# Patient Record
Sex: MALE | Race: WHITE | Employment: FULL TIME | URBAN - NONMETROPOLITAN AREA
[De-identification: names, ages, dates, MRNs, and addresses within clinical notes are randomized per-mention and may not be internally consistent; named-entity substitution may affect disease eponyms.]

---

## 2021-02-25 ENCOUNTER — OFFICE VISIT (OUTPATIENT)
Dept: FAMILY MEDICINE CLINIC | Age: 26
End: 2021-02-25
Payer: COMMERCIAL

## 2021-02-25 VITALS
BODY MASS INDEX: 25.24 KG/M2 | OXYGEN SATURATION: 98 % | DIASTOLIC BLOOD PRESSURE: 62 MMHG | WEIGHT: 181 LBS | SYSTOLIC BLOOD PRESSURE: 118 MMHG | HEART RATE: 60 BPM | TEMPERATURE: 97.8 F

## 2021-02-25 DIAGNOSIS — Z00.00 ENCOUNTER FOR PREVENTATIVE ADULT HEALTH CARE EXAMINATION: Primary | ICD-10-CM

## 2021-02-25 PROCEDURE — 99395 PREV VISIT EST AGE 18-39: CPT | Performed by: FAMILY MEDICINE

## 2021-02-25 NOTE — PROGRESS NOTES
Subjective: Vinod Saavedra is a 27 y.o. 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 He has had no nausea vomiting or diarrhea. No rash no syncope no loss of consciousness he is physically very active he is just past an EMT course. No significant tobacco use. No alcohol use as well. He sleeps well at night he eats well. He denies any anxiety or depression. Home Medications    Not on File      No Known Allergies  Social History     Tobacco Use    Smoking status: Never Smoker   Substance Use Topics    Alcohol use: Yes     Comment: OCCASIONALLY    Drug use: Not on file            Review of Systems   Constitutional: Negative. HENT: Negative. Eyes: Negative. Respiratory: Negative. Cardiovascular: Negative. Gastrointestinal: Negative. Endocrine: Negative. Genitourinary: Negative. Musculoskeletal: Negative. Allergic/Immunologic: Negative. Neurological: Negative. Psychiatric/Behavioral: Negative. Physical Exam   Objective:     Visit Vitals  /62   Pulse 60   Temp 97.8 °F (36.6 °C)   Wt 181 lb (82.1 kg)   SpO2 98%   BMI 25.24 kg/m²      General: alert, cooperative, no distress   Mental  status: normal mood, behavior, speech, dress, motor activity, and thought processes, able to follow commands   HENT: NCAT   Neck: no visualized mass   Resp: no respiratory distress   Neuro: no gross deficits   Skin: no discoloration or lesions of concern on visible areas   Psychiatric: normal affect, consistent with stated mood, no evidence of hallucinations   Exam is completely normal normal male genitalia. No hernias. No nodules self testicular exam was taught.   No scoliosis extremities are clear he is not clinically anxious or depressed    Assessment & Plan:     Adult health evaluation: Sickle exam is completely normal.  His PPD was placed. That will be followed up in 72 hours. I allowed the patient to ventilate regarding issues in his life he is in a very good place he is working quite hard he is active and engaged in what going on. Work is necessary at this time. We will follow up on an as-needed basis. 712    Additional exam findings: We discussed the expected course, resolution and complications of the diagnosis(es) in detail. Medication risks, benefits, costs, interactions, and alternatives were discussed as indicated. I advised him to contact the office if his condition worsens, changes or fails to improve as anticipated. He expressed understanding with the diagnosis(es) and plan.

## 2021-03-27 PROBLEM — Z00.00 ENCOUNTER FOR PREVENTATIVE ADULT HEALTH CARE EXAMINATION: Status: RESOLVED | Noted: 2021-02-25 | Resolved: 2021-03-27

## 2021-07-30 ENCOUNTER — HOSPITAL ENCOUNTER (OUTPATIENT)
Dept: LAB | Age: 26
Discharge: HOME | End: 2021-07-30
Payer: COMMERCIAL

## 2021-07-30 ENCOUNTER — OFFICE VISIT (OUTPATIENT)
Dept: FAMILY MEDICINE CLINIC | Age: 26
End: 2021-07-30
Payer: COMMERCIAL

## 2021-07-30 DIAGNOSIS — Z11.3 SCREENING FOR VENEREAL DISEASE (VD): Primary | ICD-10-CM

## 2021-07-30 PROCEDURE — 36415 COLL VENOUS BLD VENIPUNCTURE: CPT

## 2021-07-30 PROCEDURE — 3511F CHLMYD/GONRH TSTS DOCD DONE: CPT | Performed by: FAMILY MEDICINE

## 2021-07-30 PROCEDURE — 99213 OFFICE O/P EST LOW 20 MIN: CPT | Performed by: FAMILY MEDICINE

## 2021-07-30 PROCEDURE — 87389 HIV-1 AG W/HIV-1&-2 AB AG IA: CPT

## 2021-07-30 PROCEDURE — 87491 CHLMYD TRACH DNA AMP PROBE: CPT

## 2021-07-30 NOTE — PROGRESS NOTES
Subjective: Afua Ramos is a 27 y.o. male who was seen for No chief complaint on file. HPI is a 25-year-old male who had sexual intercourse several weeks ago with a woman in [facility]. The condom apparently broke. He has been concerned since that time. He has had a little itchy sensation on his penis there was oral sex involved. He has had no fever or chills no true discharge. Bowel movements have been appropriate. No rashes. Eating relatively well he is changing jobs and moving to [place]. Home Medications    Not on File      No Known Allergies  Social History     Tobacco Use    Smoking status: Never Smoker   Substance Use Topics    Alcohol use: Yes     Comment: OCCASIONALLY    Drug use: Not on file            Review of Systems   Constitutional: Negative. HENT: Negative. Eyes: Negative. Respiratory: Negative. Cardiovascular: Negative. Gastrointestinal: Negative. Endocrine: Negative. Genitourinary: Negative. Musculoskeletal: Negative. Neurological: Negative. Hematological: Negative. Psychiatric/Behavioral: Negative. Physical Exam   Objective: There were no vitals taken for this visit. General: alert, cooperative, no distress   Mental  status: normal mood, behavior, speech, dress, motor activity, and thought processes, able to follow commands   HENT: NCAT   Neck: no visualized mass   Resp: no respiratory distress   Neuro: no gross deficits   Skin: no discoloration or lesions of concern on visible areas   Psychiatric: normal affect, consistent with stated mood, no evidence of hallucinations   Are clear the cardiovascular exam shows a regular rate and rhythm the abdomen is benign the extremities are clear pleasant and cooperative in no distress.     Assessment & Plan:     Screening for venereal disease: I am going to check a urine for gonorrhea and chlamydia I am going to check an HIV test.  There is no real significant concern about an RPR that is needed at this point I do not think he needs antibiotics I had a long discussion with patient about safe sex which we have talked about before I suggested he use a better brand of condom and may be not put it on very tightly. He will call me if there are any questions. 712    Additional exam findings: We discussed the expected course, resolution and complications of the diagnosis(es) in detail. Medication risks, benefits, costs, interactions, and alternatives were discussed as indicated. I advised him to contact the office if his condition worsens, changes or fails to improve as anticipated. He expressed understanding with the diagnosis(es) and plan.

## 2021-08-03 LAB
HIV 1+2 AB+HIV1 P24 AG SERPL QL IA: NONREACTIVE
HIV12 RESULT COMMENT, HHIVC: NORMAL

## 2021-08-04 ENCOUNTER — TELEPHONE (OUTPATIENT)
Dept: BEHAVIORAL HEALTH UNIT | Age: 26
End: 2021-08-04

## 2021-08-04 DIAGNOSIS — N34.2 URETHRITIS: Primary | ICD-10-CM

## 2021-08-04 LAB
C TRACH RRNA SPEC QL NAA+PROBE: POSITIVE
N GONORRHOEA RRNA SPEC QL NAA+PROBE: NEGATIVE
PLEASE NOTE:, 188601: ABNORMAL
SPECIMEN SOURCE: ABNORMAL

## 2021-08-04 RX ORDER — DOXYCYCLINE 100 MG/1
100 TABLET ORAL 2 TIMES DAILY
Qty: 20 TABLET | Refills: 0 | Status: SHIPPED | OUTPATIENT
Start: 2021-08-04

## 2021-08-29 PROBLEM — Z11.3 SCREENING FOR VENEREAL DISEASE (VD): Status: RESOLVED | Noted: 2021-02-25 | Resolved: 2021-08-29
